# Patient Record
Sex: FEMALE | Race: WHITE | NOT HISPANIC OR LATINO | Employment: UNEMPLOYED | URBAN - METROPOLITAN AREA
[De-identification: names, ages, dates, MRNs, and addresses within clinical notes are randomized per-mention and may not be internally consistent; named-entity substitution may affect disease eponyms.]

---

## 2017-03-10 ENCOUNTER — HOSPITAL ENCOUNTER (OUTPATIENT)
Dept: RADIOLOGY | Facility: HOSPITAL | Age: 82
Discharge: HOME | End: 2017-03-10
Attending: PODIATRIST
Payer: MEDICARE

## 2017-03-10 ENCOUNTER — OFFICE VISIT (OUTPATIENT)
Dept: PODIATRY | Facility: CLINIC | Age: 82
End: 2017-03-10
Payer: MEDICARE

## 2017-03-10 VITALS
SYSTOLIC BLOOD PRESSURE: 138 MMHG | WEIGHT: 105.19 LBS | HEART RATE: 76 BPM | RESPIRATION RATE: 18 BRPM | DIASTOLIC BLOOD PRESSURE: 70 MMHG | BODY MASS INDEX: 20.65 KG/M2 | HEIGHT: 60 IN

## 2017-03-10 DIAGNOSIS — M89.8X9 BONY EXOSTOSIS: ICD-10-CM

## 2017-03-10 DIAGNOSIS — M20.5X2 ADDUCTOVARUS ROTATION OF TOE, ACQUIRED, LEFT: Primary | ICD-10-CM

## 2017-03-10 DIAGNOSIS — L84 CORN OR CALLUS: ICD-10-CM

## 2017-03-10 DIAGNOSIS — M20.5X2 ADDUCTOVARUS ROTATION OF TOE, ACQUIRED, LEFT: ICD-10-CM

## 2017-03-10 PROCEDURE — 73630 X-RAY EXAM OF FOOT: CPT | Mod: 26,LT,, | Performed by: RADIOLOGY

## 2017-03-10 PROCEDURE — 99213 OFFICE O/P EST LOW 20 MIN: CPT | Mod: PBBFAC,PO | Performed by: PODIATRIST

## 2017-03-10 PROCEDURE — 99999 PR PBB SHADOW E&M-EST. PATIENT-LVL III: CPT | Mod: PBBFAC,,, | Performed by: PODIATRIST

## 2017-03-10 PROCEDURE — 99214 OFFICE O/P EST MOD 30 MIN: CPT | Mod: S$PBB,,, | Performed by: PODIATRIST

## 2017-03-10 PROCEDURE — 73630 X-RAY EXAM OF FOOT: CPT | Mod: TC,PO,LT

## 2017-03-10 NOTE — PATIENT INSTRUCTIONS
"Use pads to off load pressure to toes and wider extra depth shoes.      How to Order Small Donut Pads from [employer]:   (Pedi-Pad #101A 1/8" Adhesive Foam)     Call [phone] and present them with reorder number [id number]. [facility] will ask for your insurance information and you may possibly be able to bill your insurance for the price of the pads depending on what insurance you have. [employer] will then mail the bag of pads to your house.     Additional Information:     UPN Number: [id number]  UOM PK/100     You may also use their web site, [email or web address], for ordering.          How to Order Medium Roll Foam Pads from [employer] [facility]:   (Pedi-Foam Sleeves MD 7/8"x12" Single Med)     Call [phone] and present them with reorder number [id number]. [facility] will ask for your insurance information and you may possibly be able to bill your insurance for the price of the pads depending on what insurance you have. [employer] [facility] will then mail the bag of pads to your house.     Additional Information:     UPN Number: [id number]   UOM PK/10    You may also use their web site, [email or web address], for ordering.       How to Order Large Interdigital Pads from [employer]:   (Toe Separators, Large #8130)     Call [phone] and present them with item number [id number]. [facility] will ask for your insurance information and you may possibly be able to bill your insurance for the price of the pads depending on what insurance you have. [employer] [facility] will then mail the bag of pads to your house.     Additional Information:     UOM PK/12    You may also use their web site, [email or web address], for ordering.     **Note: This information is for the LARGE interdigital pads. There is no item number for the SMALL. [employer] has asked that when patients call to place the order, they advise the  that they are ordering the SMALL size, but do not have the item " number. Also, please feel free to call the clinic at [phone] with any questions or concerns.**

## 2017-03-10 NOTE — MR AVS SNAPSHOT
[facility]  [address] [place] [address]  Phone: [phone]                  Kimmy Hawk   3/10/2017 1:00 PM   Office Visit    Description:  Female : 1934   Provider:  Renee Torres DPM   Department:  [facility]           Reason for Visit     Plantar Warts           Diagnoses this Visit        Comments    Adductovarus rotation of toe, acquired, left    -  Primary     Bony exostosis         Corn or callus                To Do List           Future Appointments        Provider Department Dept Phone    3/10/2017 2:00 PM [facility] XR1 [facility] [phone]    2017 2:20 PM Renee Torres DPM [facility] [phone]      Goals (5 Years of Data)     None      Follow-Up and Disposition     Return in about 4 weeks (around 2017) for review x-rays.      [facility]     [facility] Nurse Care Line -  Assistance  Registered nurses in the [facility] provide clinical advisement, health education, appointment booking, and other advisory services.  Call for this free service at [phone].             Medications           Message regarding Medications     Verify the changes and/or additions to your medication regime listed below are the same as discussed with your clinician today.  If any of these changes or additions are incorrect, please notify your healthcare provider.             Verify that the below list of medications is an accurate representation of the medications you are currently taking.  If none reported, the list may be blank. If incorrect, please contact your healthcare provider. Carry this list with you in case of emergency.           Current Medications     amlodipine (NORVASC) 5 MG tablet Take 5 mg by mouth.    cholecalciferol, vitamin D3, (VITAMIN D3) 2,000 unit Cap Take by mouth.    gabapentin (NEURONTIN) 100 MG capsule Take 100 mg by mouth.    irbesartan (AVAPRO) 300 MG tablet Take 300 mg  "by mouth.    magnesium 250 mg Tab Take by mouth.           Clinical Reference Information           Your Vitals Were     BP Pulse Resp Height Weight BMI    138/70 76 18 5' (1.524 m) 47.7 kg (105 lb 2.6 oz) 20.54 kg/m2      Blood Pressure          Most Recent Value    BP  138/70      Allergies as of 3/10/2017     Amoxicillin    Levofloxacin      Immunizations Administered on Date of Encounter - 3/10/2017     None      Orders Placed During Today's Visit     Future Labs/Procedures Expected by Expires    X-Ray Foot Complete Left  3/10/2017 3/10/2018      [facility] Sign-Up     Activating your [facility] account is as easy as 1-2-3!     1) Visit [email or web address], select Sign Up Now, enter this activation code and your date of birth, then select Next.  [email or web address]  Expires: 4/24/2017  1:32 PM      2) Create a username and password to use when you visit [facility] in the future and select a security question in case you lose your password and select Next.    3) Enter your e-mail address and click Sign Up!    Additional Information  If you have questions, please e-mail [email or web address] or call [phone] to talk to our [facility] staff. Remember, [facility] is NOT to be used for urgent needs. For medical emergencies, dial 911.         Instructions    Use pads to off load pressure to toes and wider extra depth shoes.      How to Order Small Donut Pads from [employer]:   (Pedi-Pad #101A 1/8" Adhesive Foam)     Call [phone] and present them with reorder number [id number]. [employer] will ask for your insurance information and you may possibly be able to bill your insurance for the price of the pads depending on what insurance you have. [employer] will then mail the bag of pads to your house.     Additional Information:     UPN Number: [id number]  U PK/100     You may also use their web site, [email or web address], for ordering.          How to Order Medium Roll Foam Pads from [employer]: " "  (Pedi-Foam Sleeves MD 7/8"x12" Single Med)     Call [phone] and present them with reorder number [id number]. [facility] will ask for your insurance information and you may possibly be able to bill your insurance for the price of the pads depending on what insurance you have. [facility] will then mail the bag of pads to your house.     Additional Information:     UPN Number: [id number]   U PK/10    You may also use their web site, [email or web address], for ordering.       How to Order Large Interdigital Pads from [facility]:   (Toe Separators, Large #8130)     Call [phone] and present them with item number [id number]. [facility] will ask for your insurance information and you may possibly be able to bill your insurance for the price of the pads depending on what insurance you have. [facility] [employer] will then mail the bag of pads to your house.     Additional Information:     U PK/12    You may also use their web site, [email or web address], for ordering.     **Note: This information is for the LARGE interdigital pads. There is no item number for the SMALL. [facility] has asked that when patients call to place the order, they advise the  that they are ordering the SMALL size, but do not have the item number. Also, please feel free to call the clinic at [phone] with any questions or concerns.**               Language Assistance Services     ATTENTION: Language assistance services are available, free of charge. Please call [phone].      ATENCIÓN: Si habla radha, tiene a berumen disposición servicios gratuitos de asistencia lingüística. Llame al [phone].     CHÚ Ý: N?u b?n nói Ti?ng Vi?t, có các d?ch v? h? tr? ngôn ng? mi?n phí dành cho b?n. G?i s? [phone].         [facility] complies with applicable Federal civil rights laws and does not discriminate on the basis of race, color, national origin, age, disability, or sex.      "

## 2017-03-14 RX ORDER — UREA 40 %
CREAM (GRAM) TOPICAL 3 TIMES DAILY
Qty: 198.5 G | Refills: 4 | Status: SHIPPED | OUTPATIENT
Start: 2017-03-14 | End: 2017-04-13

## 2017-03-14 NOTE — PROGRESS NOTES
Subjective:      Patient ID: Kimmy Hawk is a 82 y.o. female.    Chief Complaint: Plantar Warts (left foot 5th digit// Dr. Rodriguez 4 months ago)    Kimmy Hawk is a  82 y.o. year old female presenting to podiatry clinic for complaint of kissing corns between 4th toe and 5th toe on the left foot by referral from [facility]. Patient relates noticing pain in between the toes for about 4 months On closer inspection, the patient noticed some buildup and thickening of the skin between the toes. The patient rates the pain as bad as 5/10 aching pain. The patient has noticed the pain worse with weightbearing activities and certain types of tighter fitting shoes. The patient had been seen by Dr. Topete in the was who recommended filing and pads with no relief of pain.      Review of Systems   Constitution: Negative for chills and fever.   Cardiovascular: Negative for chest pain.   Respiratory: Negative for shortness of breath.    Gastrointestinal: Negative for nausea and vomiting.           Objective:      Physical Exam   Constitutional: She is oriented to person, place, and time. She appears well-developed and well-nourished. No distress.   Cardiovascular:   Pulses:       Dorsalis pedis pulses are 2+ on the right side, and 2+ on the left side.        Posterior tibial pulses are 2+ on the right side, and 2+ on the left side.   Capillary fill time 3 seconds to digits bilateral feet.   Musculoskeletal:   Lower extremities:    Deformities: adductovarus hammertoe 5th toe with hypertrophic condyle left.     Normal arch height and rectus feet bilaterally.     5/5 muscle strength and tone in all four quadrants bilaterally.     Pain-free range of motion in all four quadrants with stiffness and limitation bilaterally.     Pain on palpation: sub keratotic lesion left 5th toe.   Neurological: She is alert and oriented to person, place, and time. She displays no Babinski's sign on the right side. She displays no Babinski's sign on  the left side.   Plantar protective threshold sensation by touch via 5.07 SWMF present bilaterally.    Skin:   Lower extremities:    Normal turgor, texture, temperature bilaterally. Digital hair present bilaterally. Warm equally bilaterally.     No edema, varicosities, pigmentary changes.    No wounds, drainage, malodor, erythema, interdigital maceration were noted.     Hyperkeratotic lesion: medial 5th digit dipj left.     Nails are clear bilaterally.       Psychiatric: She has a normal mood and affect.   Nursing note and vitals reviewed.    X-rays ordered.        Assessment:       Encounter Diagnoses   Name Primary?    Adductovarus rotation of toe, acquired, left Yes    Bony exostosis     Corn or callus          Plan:       Kimmy was seen today for plantar warts.    Diagnoses and all orders for this visit:    Adductovarus rotation of toe, acquired, left  -     X-Ray Foot Complete Left; Future    Bony exostosis  -     X-Ray Foot Complete Left; Future    Corn or callus  -     X-Ray Foot Complete Left; Future    Other orders  -     urea (CARMOL) 40 % Crea; Apply topically 3 (three) times daily. Apply to rough skin on feet      I counseled the patient on her conditions, their implications and medical management.    The interdigital corn was sharply trimmed of hyperkeratotic tissue with number 15 blade with good relief of some of her pain. We also discussed the underlying cause of the lesion as a combination of the proximity of the toes rubbing on one another along with the prominent bony condyles or spurs. Various types of pads were dispensed to the patient to relieve pressure between the toes. The patient was also given guidance on shoe modifications along with prescription for urea lotion to prevent rapid build up of the keratotic tissue. We also briefly discussed the possibility of surgical resection of the prominent bone with possible soft tissue release and arthroplasty if conservative measures fail to  resolve the patients problem. X-rays were ordered to review on follow up.      .

## 2017-03-23 ENCOUNTER — TELEPHONE (OUTPATIENT)
Dept: PODIATRY | Facility: CLINIC | Age: 82
End: 2017-03-23

## 2017-03-23 NOTE — TELEPHONE ENCOUNTER
----- Message from Liudmila Lopez sent at 3/23/2017  9:17 AM CDT -----  Pt is requesting a call from nurse in regards to x ray results.          Please call pt back at [phone]

## 2017-03-23 NOTE — TELEPHONE ENCOUNTER
----- Message from Liudmila Lopez sent at 3/23/2017  9:17 AM CDT -----  Pt is requesting a call from nurse in regards to x ray results.          Please call pt back at [phone]

## 2017-03-23 NOTE — TELEPHONE ENCOUNTER
Phoned patient back and gave her her results of her foot Xray. Patient also made aware that provider will go over her results at her next visit as well on April 7th. The call ended well.

## 2017-04-07 ENCOUNTER — OFFICE VISIT (OUTPATIENT)
Dept: PODIATRY | Facility: CLINIC | Age: 82
End: 2017-04-07
Payer: MEDICARE

## 2017-04-07 VITALS
BODY MASS INDEX: 20.6 KG/M2 | HEART RATE: 66 BPM | HEIGHT: 60 IN | SYSTOLIC BLOOD PRESSURE: 118 MMHG | RESPIRATION RATE: 18 BRPM | WEIGHT: 104.94 LBS | DIASTOLIC BLOOD PRESSURE: 60 MMHG

## 2017-04-07 DIAGNOSIS — M20.5X2 ADDUCTOVARUS ROTATION OF TOE, ACQUIRED, LEFT: Primary | ICD-10-CM

## 2017-04-07 DIAGNOSIS — L84 CORN OR CALLUS: ICD-10-CM

## 2017-04-07 DIAGNOSIS — M89.8X9 BONY EXOSTOSIS: ICD-10-CM

## 2017-04-07 PROCEDURE — 99213 OFFICE O/P EST LOW 20 MIN: CPT | Mod: PBBFAC,PO | Performed by: PODIATRIST

## 2017-04-07 PROCEDURE — 99213 OFFICE O/P EST LOW 20 MIN: CPT | Mod: S$PBB,,, | Performed by: PODIATRIST

## 2017-04-07 PROCEDURE — 99999 PR PBB SHADOW E&M-EST. PATIENT-LVL III: CPT | Mod: PBBFAC,,, | Performed by: PODIATRIST

## 2017-04-07 NOTE — MR AVS SNAPSHOT
[facility]  [address] [place] [address]  Phone: [phone]                  Kimmy Hawk   2017 2:20 PM   Office Visit    Description:  Female : 1934   Provider:  Renee Torres DPM   Department:  [facility]           Reason for Visit     Follow-up                To Do List           Future Appointments        Provider Department Dept Phone    2017 8:20 AM Renee Torres DPM [facility] [phone]      Goals (5 Years of Data)     None      [facility]     [facility] Nurse Care Line -  Assistance  Unless otherwise directed by your provider, please contact [facility], our nurse care line that is available for  assistance.     Registered nurses in the [facility] Center provide: appointment scheduling, clinical advisement, health education, and other advisory services.  Call: [phone] (toll free)               Medications           Message regarding Medications     Verify the changes and/or additions to your medication regime listed below are the same as discussed with your clinician today.  If any of these changes or additions are incorrect, please notify your healthcare provider.        STOP taking these medications     irbesartan (AVAPRO) 300 MG tablet Take 300 mg by mouth.           Verify that the below list of medications is an accurate representation of the medications you are currently taking.  If none reported, the list may be blank. If incorrect, please contact your healthcare provider. Carry this list with you in case of emergency.           Current Medications     amlodipine (NORVASC) 5 MG tablet Take 5 mg by mouth.    cholecalciferol, vitamin D3, (VITAMIN D3) 2,000 unit Cap Take by mouth.    gabapentin (NEURONTIN) 100 MG capsule Take 100 mg by mouth.    magnesium 250 mg Tab Take by mouth.    urea (CARMOL) 40 % Crea Apply topically 3 (three) times daily. Apply to rough skin on feet            Clinical Reference Information           Your Vitals Were     BP Pulse Resp Height Weight BMI    118/60 66 18 5' (1.524 m) 47.6 kg (104 lb 15 oz) 20.49 kg/m2      Blood Pressure          Most Recent Value    BP  118/60      Allergies as of 4/7/2017     Amoxicillin    Levofloxacin      Immunizations Administered on Date of Encounter - 4/7/2017     None      [facility] Sign-Up     Activating your [facility] account is as easy as 1-2-3!     1) Visit [email or web address], select Sign Up Now, enter this activation code and your date of birth, then select Next.  [email or web address]  Expires: 4/24/2017  2:32 PM      2) Create a username and password to use when you visit [facility] in the future and select a security question in case you lose your password and select Next.    3) Enter your e-mail address and click Sign Up!    Additional Information  If you have questions, please e-mail [email or web address] or call [phone] to talk to our [facility] staff. Remember, [facility] is NOT to be used for urgent needs. For medical emergencies, dial 911.         Language Assistance Services     ATTENTION: Language assistance services are available, free of charge. Please call [phone].      ATENCIÓN: Si habla español, tiene a berumen disposición servicios gratuitos de asistencia lingüística. Llame al [phone].     CHÚ Ý: N?u b?n nói Ti?ng Vi?t, có các d?ch v? h? tr? ngôn ng? mi?n phí dành cho b?n. G?i s? [phone].         [facility] complies with applicable Federal civil rights laws and does not discriminate on the basis of race, color, national origin, age, disability, or sex.

## 2017-04-08 NOTE — PROGRESS NOTES
Subjective:      Patient ID: Kimmy Hawk is a 82 y.o. female.    Chief Complaint: Follow-up (left foot little digit// Dr. Rodriguez 3-4 months)    Kimmy Hawk is a 82 y.o. year-old female following up for painful left 5th toe interdigital corn. She now rates pain as 5/10 sharp pain that is unchanged. She relates that she did not use recommended prescribed urea lotion because she did not realize that it had been sent to the pharmacy. She has also been using the pads but off and on because they do not stay in place well complains that the corn has returned. She is here to day to review x-rays and inquires if anything more can be done.        Review of Systems   Constitution: Negative for chills and fever.   Cardiovascular: Negative for chest pain.   Respiratory: Negative for shortness of breath.    Gastrointestinal: Negative for nausea and vomiting.           Objective:      Physical Exam   Constitutional: She is oriented to person, place, and time. She appears well-developed and well-nourished. No distress.   Cardiovascular:   Pulses:       Dorsalis pedis pulses are 2+ on the right side, and 2+ on the left side.        Posterior tibial pulses are 2+ on the right side, and 2+ on the left side.   Capillary fill time 3 seconds to digits bilateral feet.   Musculoskeletal:   Lower extremities:    Deformities: adductovarus hammertoe 5th toe with hypertrophic condyle left.     Normal arch height and rectus feet bilaterally.     5/5 muscle strength and tone in all four quadrants bilaterally.     Pain-free range of motion in all four quadrants with stiffness and limitation bilaterally.     Pain on palpation: sub keratotic lesion left 5th toe.   Neurological: She is alert and oriented to person, place, and time. She displays no Babinski's sign on the right side. She displays no Babinski's sign on the left side.   Plantar protective threshold sensation by touch via 5.07 SWMF present bilaterally.    Skin:   Lower  extremities:    Normal turgor, texture, temperature bilaterally. Digital hair present bilaterally. Warm equally bilaterally.     No edema, varicosities, pigmentary changes.    No wounds, drainage, malodor, erythema, interdigital maceration were noted.     Hyperkeratotic lesion: medial 5th digit dipj left.     Nails are clear bilaterally.       Psychiatric: She has a normal mood and affect.   Nursing note and vitals reviewed.    X-rays: Adductovarus under-riding left 5th toe with prominent condyles 4th pipj and 5th dipj.        Assessment:       Encounter Diagnoses   Name Primary?    Adductovarus rotation of toe, acquired, left Yes    Bony exostosis     Corn or callus          Plan:       Kimmy was seen today for follow-up.    Diagnoses and all orders for this visit:    Adductovarus rotation of toe, acquired, left    Bony exostosis    Corn or callus      I counseled the patient on her conditions, their implications and medical management. I independently reviewed the radiographs myself and discussed with patient. No fractures or erosions were noted by me.    Reviewed findings and explained condition to the patient with visual reference to the foot and x-rays reviewed. We discussed the proximity and deformity of the toes as the etiology of recurrent corn formation with pain. We once again discussed use of the urea lotion which she will go ahead and  from the pharmacy. Additional offloading cushion pads were applied but she stated she could not see herself continuing to use the padding. Patient was also guided on shoe gear selection of extra depth or larger toe boxed shoes. We also discussed further about corrective surgery involving combination of arthroplasty and exostectomy with brief review of what to expect preop, intraop and postop. Patient wished to consider surgical option but will go ahead and try conservative recommendations and fill her urea prescription.    .

## 2017-04-13 ENCOUNTER — TELEPHONE (OUTPATIENT)
Dept: PODIATRY | Facility: CLINIC | Age: 82
End: 2017-04-13

## 2017-04-13 NOTE — TELEPHONE ENCOUNTER
----- Message from Cathy Cruz sent at 4/12/2017 12:30 PM CDT -----  Contact: Patient  Patient called to speak with the nurse regarding the cream that was supposed to be called in for her foot. She can be contacted at [phone].    Thanks,  Cathy

## 2017-04-13 NOTE — TELEPHONE ENCOUNTER
Phoned patient back, patient wanted to verify the correct cream for her feet. I explained to patient she has the correct cream and she has refills at her local pharmacy. Patient was happy with the call and the call ended well.

## 2017-05-01 ENCOUNTER — OFFICE VISIT (OUTPATIENT)
Dept: PODIATRY | Facility: CLINIC | Age: 82
End: 2017-05-01
Payer: MEDICARE

## 2017-05-01 VITALS — BODY MASS INDEX: 20.38 KG/M2 | HEIGHT: 60 IN | WEIGHT: 103.81 LBS

## 2017-05-01 DIAGNOSIS — M89.8X9 BONY EXOSTOSIS: ICD-10-CM

## 2017-05-01 DIAGNOSIS — L84 CORN OR CALLUS: ICD-10-CM

## 2017-05-01 DIAGNOSIS — M20.42 HAMMER TOE OF LEFT FOOT: Primary | ICD-10-CM

## 2017-05-01 PROCEDURE — 99999 PR PBB SHADOW E&M-EST. PATIENT-LVL IV: CPT | Mod: PBBFAC,,, | Performed by: PODIATRIST

## 2017-05-01 PROCEDURE — 99214 OFFICE O/P EST MOD 30 MIN: CPT | Mod: 57,S$PBB,, | Performed by: PODIATRIST

## 2017-05-01 PROCEDURE — 99214 OFFICE O/P EST MOD 30 MIN: CPT | Mod: PBBFAC,PO | Performed by: PODIATRIST

## 2017-05-01 RX ORDER — IRBESARTAN 300 MG/1
TABLET ORAL
COMMUNITY
Start: 2017-03-17 | End: 2017-05-01

## 2017-05-01 NOTE — MR AVS SNAPSHOT
[facility]  [address] [facility] [address] [place] [address]  Phone: [phone]  Fax: [phone]                  Kimmy Hawk   2017 1:00 PM   Office Visit    Description:  Female : 1934   Provider:  Renee Torres DPM   Department:  [facility] - Podiatry           Reason for Visit     Follow-up           Diagnoses this Visit        Comments    Hammer toe of left foot    -  Primary     Corn or callus         Bony exostosis                To Do List           Future Appointments        Provider Department Dept Phone    2017 8:20 AM Renee Torres DPM [facility] Pod[facility] [phone]      Goals (5 Years of Data)     None      [facility]     [facility] Care [facility] -  Assistance  Unless otherwise directed by your provider, please contact [facility], our nurse care line that is available for  assistance.     Registered nurses in the [facility] provide: appointment scheduling, clinical advisement, health education, and other advisory services.  Call: [phone] (toll free)               Medications           Message regarding Medications     Verify the changes and/or additions to your medication regime listed below are the same as discussed with your clinician today.  If any of these changes or additions are incorrect, please notify your healthcare provider.        STOP taking these medications     irbesartan (AVAPRO) 300 MG tablet TAKE 1 TABLET DAILY           Verify that the below list of medications is an accurate representation of the medications you are currently taking.  If none reported, the list may be blank. If incorrect, please contact your healthcare provider. Carry this list with you in case of emergency.           Current Medications     amlodipine (NORVASC) 5 MG tablet Take 5 mg by mouth.    cholecalciferol, vitamin D3, (VITAMIN D3) 2,000 unit Cap Take by mouth.    gabapentin (NEURONTIN) 100 MG capsule Take 100 mg by mouth.    magnesium 250  mg Tab Take by mouth.           Clinical Reference Information           Your Vitals Were     Height Weight BMI          5' (1.524 m) 47.1 kg (103 lb 13.4 oz) 20.28 kg/m2        Allergies as of 5/1/2017     Amoxicillin    Levofloxacin      Immunizations Administered on Date of Encounter - 5/1/2017     None      [facility] Sign-Up     Activating your [facility] account is as easy as 1-2-3!     1) Visit [email or web address], select Sign Up Now, enter this activation code and your date of birth, then select Next.  [email or web address]  Expires: 6/15/2017  1:56 PM      2) Create a username and password to use when you visit [facility] in the future and select a security question in case you lose your password and select Next.    3) Enter your e-mail address and click Sign Up!    Additional Information  If you have questions, please e-mail [email or web address] or call [phone] to talk to our [facility] staff. Remember, [facility] is NOT to be used for urgent needs. For medical emergencies, dial 911.         Language Assistance Services     ATTENTION: Language assistance services are available, free of charge. Please call [phone].      ATENCIÓN: Si habla radha, tiene a berumen disposición servicios gratuitos de asistencia lingüística. Llame al [phone].     CHÚ Ý: N?u b?n nói Ti?ng Vi?t, có các d?ch v? h? tr? ngôn ng? mi?n phí dành cho b?n. G?i s? [phone].         [facility] complies with applicable Federal civil rights laws and does not discriminate on the basis of race, color, national origin, age, disability, or sex.

## 2017-05-01 NOTE — PATIENT INSTRUCTIONS
Please review copies of your surgical consent and postop instruction sheets and call if you have any questions or concerns.

## 2017-05-01 NOTE — PROGRESS NOTES
Subjective:      Patient ID: Kimmy Hawk is a 82 y.o. female.    Chief Complaint: Follow-up (Left fifth digit adductovarus. Discuss possible surgery)    Kimmy Hawk is a 82 y.o. 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 The patient has tried conservative therapy alternatives including different shoes, insoles, pads, and shaving of the lesion with temporaty relief to treat recurrent corn and bone spur with toe deformity.  She continues to complain of 5 out of 10 shooting pains with certain types of shoes and weightbearing activities.  The only thing that gives true relief is staying non-weightbearing with no pressure. The pain is now interfering with activities of daily living. The patient now seeks a more permanent solution to this problem and would like to proceed with surgical intervention.          Review of Systems   Constitution: Negative for chills and fever.   Cardiovascular: Negative for chest pain.   Respiratory: Negative for shortness of breath.    Gastrointestinal: Negative for nausea and vomiting.           Objective:      Physical Exam   Constitutional: She is oriented to person, place, and time. She appears well-developed and well-nourished. No distress.   Cardiovascular:   Pulses:       Dorsalis pedis pulses are 2+ on the right side, and 2+ on the left side.        Posterior tibial pulses are 2+ on the right side, and 2+ on the left side.   Capillary fill time 3 seconds to digits bilateral feet.   Musculoskeletal:   Lower extremities:    Deformities: adductovarus hammertoe 5th toe with hypertrophic condyle left.     Normal arch height and rectus feet bilaterally.     5/5 muscle strength and tone in all four quadrants bilaterally.     Pain-free range of motion in all four quadrants with stiffness and limitation bilaterally.     Pain on palpation: sub keratotic lesion left 5th toe.   Neurological: She is alert and oriented to person,  place, and time. She displays no Babinski's sign on the right side. She displays no Babinski's sign on the left side.   Plantar protective threshold sensation by touch via 5.07 SWMF present bilaterally.    Skin:   Lower extremities:    Normal turgor, texture, temperature bilaterally. Digital hair present bilaterally. Warm equally bilaterally.     No edema, varicosities, pigmentary changes.    No wounds, drainage, malodor, erythema, interdigital maceration were noted.     Hyperkeratotic lesion: medial 5th digit dipj left.     Nails are clear bilaterally.       Psychiatric: She has a normal mood and affect.   Nursing note and vitals reviewed.            Assessment:       Encounter Diagnoses   Name Primary?    Hammer toe of left foot Yes    Corn or callus     Bony exostosis          Plan:       Kimmy was seen today for follow-up.    Diagnoses and all orders for this visit:    Hammer toe of left foot  -     Case Request Operating Room: REPAIR-HAMMER TOE; Standing  -     Case Request Operating Room: REPAIR-HAMMER TOE    Corn or callus  -     Case Request Operating Room: REPAIR-HAMMER TOE; Standing  -     Case Request Operating Room: REPAIR-HAMMER TOE    Bony exostosis  -     Case Request Operating Room: REPAIR-HAMMER TOE; Standing  -     Case Request Operating Room: REPAIR-HAMMER TOE      I counseled the patient on her conditions, their implications and medical management.    At this time recommendation was made for medial fifth digit condylectomy with derotational fifth digit arthroplasty left foot. Preop Intra-Op and postoperative course was reviewed with the patient and patient appeared to be in good understanding. Risk benefits and alternatives were reviewed with the patient and consent was signed making no guarantees regarding the outcome of the surgery. All of the patient's questions were answered and concerns were addressed. Copies were made of the consent and given to the patient and patient was instructed  to call for any additional questions and if any problems arise. The patient is scheduled for surgery at [facility].    .

## 2017-05-30 ENCOUNTER — TELEPHONE (OUTPATIENT)
Dept: PODIATRY | Facility: CLINIC | Age: 82
End: 2017-05-30

## 2017-05-30 NOTE — TELEPHONE ENCOUNTER
----- Message from Gayle Dunbar sent at 5/30/2017  9:40 AM CDT -----  Contact: pt  States she has an appt on 6/22 and she would like a later time that day. Nothing available. Please call pt at [phone]. Thank you

## 2017-05-30 NOTE — TELEPHONE ENCOUNTER
Ms. Hawk was given an return call, and she was rescheduled with Dr. Torres for 11am at the [facility] on 6/22/2017. Patient verbalized understanding and the call ended well.

## 2017-06-05 NOTE — PRE ADMISSION SCREENING
Pre op instructions reviewed with patient per phone:    To confirm, Your surgeon has instructed you:  Surgery is scheduled 06/08/17 at 0700.      Please report to [facility] 1st floor main lobby by 0530  Pre admit office to call afternoon prior to surgery with final arrival time.      INSTRUCTIONS IMPORTANT!!!  ¨ Do not eat, drink, or smoke after 12 midnight-including water. OK to brush teeth, no gum, candy or mints!    ¨ Take only these medicines with a small swallow of water-morning of surgery.  Amlodipine        ____  Do not wear makeup, including mascara.  ____  No powder, lotions or creams to surgical area.  ____  Please remove all jewelry, including piercings and leave at home.  ____  No money or valuables needed. Please leave at home.  ____  Please bring identification and insurance information to hospital.  ____  If going home the same day, arrange for a ride home. You will not be able to   drive if Anesthesia was used.  ____  Children, under 12 years old, must remain in the waiting room with an adult.  They are not allowed in patient areas.  ____  Wear loose fitting clothing. Allow for dressings, bandages.  ____  Stop Aspirin, Ibuprofen, Motrin and Aleve at least 5-7 days before surgery, unless otherwise instructed by your doctor, or the nurse.   You MAY use Tylenol/acetaminophen until day of surgery.  ____  If you take diabetic medication, do not take am of surgery unless instructed by   Doctor.  ____ Stop taking any Fish Oil supplement or any Vitamins that contain Vitamin E at least 5 days prior to surgery.          Bathing Instructions-- The night before surgery and the morning prior to coming to the hospital:   -Do not shave the surgical area.   -Shower and wash your hair and body as usual with anti-bacterial  soap and shampoo.   -Rinse your hair and body completely.   -Use one packet of hibiclens to wash the surgical site (using your hand) gently for 5 minutes.  Do not scrub  you skin too hard.   -Do not use hibiclens on your head, face, or genitals.   -Do not wash with anti-bacterial soap after you use the hibiclens.   -Rinse your body thoroughly.   -Dry with clean, soft towel.  Do not use lotion, cream, deodorant, or powders on   the surgical site.    Use antibacterial soap in place of hibiclens if your surgery is on the head, face or genitals.         Surgical Site Infection    Prevention of surgical site infections:     -Keep incisions clean and dry.   -Do not soak/submerge incisions in water until completely healed.   -Do not apply lotions, powders, creams, or deodorants to site.   -Always make sure hands are cleaned with antibacterial soap/ alcohol-based   prior to touching the surgical site.  (This includes doctors, nurses, staff, and yourself.)    Signs and symptoms:   -Redness and pain around the area where you had surgery   -Drainage of cloudy fluid from your surgical wound   -Fever over 100.4  I have read or had read and explained to me, and understand the above information.

## 2017-06-06 ENCOUNTER — TELEPHONE (OUTPATIENT)
Dept: PODIATRY | Facility: CLINIC | Age: 82
End: 2017-06-06

## 2017-06-06 NOTE — TELEPHONE ENCOUNTER
----- Message from Sasha Kc sent at 6/2/2017 10:52 AM CDT -----  Contact: pt  Pt request call concerning her appt on 6/8, pt can be reached at [phone]///thxMW

## 2017-06-06 NOTE — TELEPHONE ENCOUNTER
----- Message from Belkis Pabon sent at 6/5/2017  9:32 AM CDT -----  Patient needs to know what time she needs to be at the hospital for her surgery and what other specifics that she needs to know?   Call her at [phone].                                             [email or web address]

## 2017-06-06 NOTE — TELEPHONE ENCOUNTER
Returned 's call regarding her wanting to know what time she should arrive for her surgery that is scheduled for 6/8/17 with .  stated the surgery department contacted her on yesterday (6/5/17) and informed her of her arrival time for surgery. There was a mutual understanding stated and the call ended well.

## 2017-06-08 ENCOUNTER — ANESTHESIA (OUTPATIENT)
Dept: SURGERY | Facility: HOSPITAL | Age: 82
End: 2017-06-08
Payer: MEDICARE

## 2017-06-08 ENCOUNTER — HOSPITAL ENCOUNTER (OUTPATIENT)
Facility: HOSPITAL | Age: 82
Discharge: HOME | End: 2017-06-08
Attending: PODIATRIST | Admitting: PODIATRIST
Payer: MEDICARE

## 2017-06-08 ENCOUNTER — TELEPHONE (OUTPATIENT)
Dept: PODIATRY | Facility: CLINIC | Age: 82
End: 2017-06-08

## 2017-06-08 ENCOUNTER — ANESTHESIA EVENT (OUTPATIENT)
Dept: SURGERY | Facility: HOSPITAL | Age: 82
End: 2017-06-08
Payer: MEDICARE

## 2017-06-08 VITALS
TEMPERATURE: 98 F | OXYGEN SATURATION: 100 % | DIASTOLIC BLOOD PRESSURE: 75 MMHG | RESPIRATION RATE: 22 BRPM | WEIGHT: 103.38 LBS | SYSTOLIC BLOOD PRESSURE: 134 MMHG | HEART RATE: 63 BPM | BODY MASS INDEX: 20.3 KG/M2 | HEIGHT: 60 IN

## 2017-06-08 DIAGNOSIS — M20.42 HAMMER TOE OF LEFT FOOT: Primary | ICD-10-CM

## 2017-06-08 DIAGNOSIS — M89.8X9 BONY EXOSTOSIS: ICD-10-CM

## 2017-06-08 DIAGNOSIS — M20.40: ICD-10-CM

## 2017-06-08 PROCEDURE — 63600175 PHARM REV CODE 636 W HCPCS: Performed by: NURSE ANESTHETIST, CERTIFIED REGISTERED

## 2017-06-08 PROCEDURE — 71000033 HC RECOVERY, INTIAL HOUR: Performed by: PODIATRIST

## 2017-06-08 PROCEDURE — 37000008 HC ANESTHESIA 1ST 15 MINUTES: Performed by: PODIATRIST

## 2017-06-08 PROCEDURE — 36000707: Performed by: PODIATRIST

## 2017-06-08 PROCEDURE — 71000015 HC POSTOP RECOV 1ST HR: Performed by: PODIATRIST

## 2017-06-08 PROCEDURE — 25000003 PHARM REV CODE 250: Performed by: PODIATRIST

## 2017-06-08 PROCEDURE — 27201423 OPTIME MED/SURG SUP & DEVICES STERILE SUPPLY: Performed by: PODIATRIST

## 2017-06-08 PROCEDURE — 28285 REPAIR OF HAMMERTOE: CPT | Mod: T4,LT,, | Performed by: PODIATRIST

## 2017-06-08 PROCEDURE — 36000706: Performed by: PODIATRIST

## 2017-06-08 PROCEDURE — 37000009 HC ANESTHESIA EA ADD 15 MINS: Performed by: PODIATRIST

## 2017-06-08 PROCEDURE — 25000003 PHARM REV CODE 250: Performed by: NURSE ANESTHETIST, CERTIFIED REGISTERED

## 2017-06-08 RX ORDER — SODIUM CHLORIDE 9 MG/ML
3 INJECTION, SOLUTION INTRAMUSCULAR; INTRAVENOUS; SUBCUTANEOUS
Status: DISCONTINUED | OUTPATIENT
Start: 2017-06-08 | End: 2017-06-08 | Stop reason: HOSPADM

## 2017-06-08 RX ORDER — ACETAMINOPHEN 10 MG/ML
1000 INJECTION, SOLUTION INTRAVENOUS ONCE
Status: DISCONTINUED | OUTPATIENT
Start: 2017-06-08 | End: 2017-06-08 | Stop reason: HOSPADM

## 2017-06-08 RX ORDER — CLINDAMYCIN PHOSPHATE 900 MG/50ML
900 INJECTION, SOLUTION INTRAVENOUS
Status: DISCONTINUED | OUTPATIENT
Start: 2017-06-08 | End: 2017-06-08 | Stop reason: HOSPADM

## 2017-06-08 RX ORDER — LIDOCAINE HYDROCHLORIDE 10 MG/ML
1 INJECTION, SOLUTION EPIDURAL; INFILTRATION; INTRACAUDAL; PERINEURAL ONCE
Status: DISCONTINUED | OUTPATIENT
Start: 2017-06-08 | End: 2017-06-08 | Stop reason: HOSPADM

## 2017-06-08 RX ORDER — PROPOFOL 10 MG/ML
VIAL (ML) INTRAVENOUS CONTINUOUS PRN
Status: DISCONTINUED | OUTPATIENT
Start: 2017-06-08 | End: 2017-06-08

## 2017-06-08 RX ORDER — MEPERIDINE HYDROCHLORIDE 50 MG/ML
12.5 INJECTION INTRAMUSCULAR; INTRAVENOUS; SUBCUTANEOUS ONCE AS NEEDED
Status: DISCONTINUED | OUTPATIENT
Start: 2017-06-08 | End: 2017-06-08 | Stop reason: HOSPADM

## 2017-06-08 RX ORDER — SODIUM CHLORIDE, SODIUM LACTATE, POTASSIUM CHLORIDE, CALCIUM CHLORIDE 600; 310; 30; 20 MG/100ML; MG/100ML; MG/100ML; MG/100ML
INJECTION, SOLUTION INTRAVENOUS CONTINUOUS PRN
Status: DISCONTINUED | OUTPATIENT
Start: 2017-06-08 | End: 2017-06-08

## 2017-06-08 RX ORDER — SODIUM CHLORIDE 9 MG/ML
3 INJECTION, SOLUTION INTRAMUSCULAR; INTRAVENOUS; SUBCUTANEOUS EVERY 8 HOURS
Status: DISCONTINUED | OUTPATIENT
Start: 2017-06-08 | End: 2017-06-08 | Stop reason: HOSPADM

## 2017-06-08 RX ORDER — PROPOFOL 10 MG/ML
VIAL (ML) INTRAVENOUS
Status: DISCONTINUED | OUTPATIENT
Start: 2017-06-08 | End: 2017-06-08

## 2017-06-08 RX ORDER — LIDOCAINE HYDROCHLORIDE 10 MG/ML
INJECTION, SOLUTION EPIDURAL; INFILTRATION; INTRACAUDAL; PERINEURAL
Status: DISCONTINUED | OUTPATIENT
Start: 2017-06-08 | End: 2017-06-08 | Stop reason: HOSPADM

## 2017-06-08 RX ORDER — HYDROCODONE BITARTRATE AND ACETAMINOPHEN 5; 325 MG/1; MG/1
1 TABLET ORAL EVERY 4 HOURS PRN
Status: DISCONTINUED | OUTPATIENT
Start: 2017-06-08 | End: 2017-06-08 | Stop reason: HOSPADM

## 2017-06-08 RX ORDER — CLINDAMYCIN HYDROCHLORIDE 300 MG/1
300 CAPSULE ORAL EVERY 8 HOURS
Qty: 30 CAPSULE | Refills: 0 | Status: SHIPPED | OUTPATIENT
Start: 2017-06-08 | End: 2017-06-18

## 2017-06-08 RX ORDER — HYDROCODONE BITARTRATE AND ACETAMINOPHEN 7.5; 325 MG/1; MG/1
1 TABLET ORAL EVERY 6 HOURS PRN
Qty: 30 TABLET | Refills: 0 | Status: SHIPPED | OUTPATIENT
Start: 2017-06-08 | End: 2017-06-14

## 2017-06-08 RX ORDER — BUPIVACAINE HYDROCHLORIDE 2.5 MG/ML
INJECTION, SOLUTION EPIDURAL; INFILTRATION; INTRACAUDAL
Status: DISCONTINUED | OUTPATIENT
Start: 2017-06-08 | End: 2017-06-08 | Stop reason: HOSPADM

## 2017-06-08 RX ORDER — ONDANSETRON 2 MG/ML
INJECTION INTRAMUSCULAR; INTRAVENOUS
Status: DISCONTINUED | OUTPATIENT
Start: 2017-06-08 | End: 2017-06-08

## 2017-06-08 RX ORDER — HYDROMORPHONE HYDROCHLORIDE 2 MG/ML
0.2 INJECTION, SOLUTION INTRAMUSCULAR; INTRAVENOUS; SUBCUTANEOUS EVERY 5 MIN PRN
Status: DISCONTINUED | OUTPATIENT
Start: 2017-06-08 | End: 2017-06-08 | Stop reason: HOSPADM

## 2017-06-08 RX ORDER — LIDOCAINE HYDROCHLORIDE 10 MG/ML
INJECTION INFILTRATION; PERINEURAL
Status: DISCONTINUED | OUTPATIENT
Start: 2017-06-08 | End: 2017-06-08

## 2017-06-08 RX ADMIN — ONDANSETRON 4 MG: 2 INJECTION, SOLUTION INTRAMUSCULAR; INTRAVENOUS at 07:06

## 2017-06-08 RX ADMIN — PROPOFOL 25 MCG/KG/MIN: 10 INJECTION, EMULSION INTRAVENOUS at 07:06

## 2017-06-08 RX ADMIN — PROPOFOL 50 MG: 10 INJECTION, EMULSION INTRAVENOUS at 07:06

## 2017-06-08 RX ADMIN — CLINDAMYCIN IN 5 PERCENT DEXTROSE 900 MG: 18 INJECTION, SOLUTION INTRAVENOUS at 06:06

## 2017-06-08 RX ADMIN — SODIUM CHLORIDE, SODIUM LACTATE, POTASSIUM CHLORIDE, AND CALCIUM CHLORIDE: 600; 310; 30; 20 INJECTION, SOLUTION INTRAVENOUS at 06:06

## 2017-06-08 RX ADMIN — LIDOCAINE HYDROCHLORIDE 60 MG: 10 INJECTION, SOLUTION INFILTRATION; PERINEURAL at 07:06

## 2017-06-08 NOTE — ANESTHESIA PREPROCEDURE EVALUATION
06/08/2017  Kimmy Hawk is a 82 y.o., female.    Pre-op Assessment    I have reviewed the Patient Summary Reports.      I have reviewed the Medications.     Review of Systems  Social:  Non-Smoker    Hematology/Oncology:  Hematology Normal   Oncology Normal     EENT/Dental:EENT/Dental Normal   Cardiovascular:   Hypertension    Pulmonary:  Pulmonary Normal    Renal/:  Renal/ Normal     Hepatic/GI:  Hepatic/GI Normal    Musculoskeletal:   Arthritis  History of back surgery, rotator cuff repair and foot surgery.   Endocrine:  Endocrine Normal    Dermatological:  Skin Normal    Psych:  Psychiatric Normal           Physical Exam  General:  Well nourished            Mental Status:  Mental Status Findings:  Cooperative, Alert and Oriented         Anesthesia Plan  Type of Anesthesia, risks & benefits discussed:  Anesthesia Type:  MAC  Patient's Preference:   Intra-op Monitoring Plan: standard ASA monitors  Intra-op Monitoring Plan Comments:   Post Op Pain Control Plan: multimodal analgesia  Post Op Pain Control Plan Comments:   Induction:   IV  Beta Blocker:  Patient is not currently on a Beta-Blocker (No further documentation required).       Informed Consent:    ASA Score: 2     Day of Surgery Review of History & Physical:

## 2017-06-08 NOTE — ANESTHESIA POSTPROCEDURE EVALUATION
Anesthesia Post Evaluation    Patient: Kimmy Hawk    Procedure(s) Performed: Procedure(s) (LRB):  REPAIR-HAMMER TOE (Left)  EXOSTECTOMY (Left)    Final Anesthesia Type: MAC  Patient location during evaluation: PACU  Patient participation: Yes- Able to Participate  Level of consciousness: awake  Post-procedure vital signs: reviewed and stable  Pain management: adequate  Airway patency: patent  PONV status at discharge: No PONV  Anesthetic complications: no      Cardiovascular status: blood pressure returned to baseline  Respiratory status: unassisted, room air and spontaneous ventilation  Hydration status: euvolemic  Follow-up not needed.        Visit Vitals  /75   Pulse 63   Temp 36.8 °C (98.2 °F) (Temporal)   Resp (!) 22   Ht 5' (1.524 m)   Wt 46.9 kg (103 lb 6.3 oz)   SpO2 100%   Breastfeeding? No   BMI 20.19 kg/m²       Pain/Helen Score: Pain Assessment Performed: Yes (6/8/2017  8:44 AM)  Presence of Pain: denies (6/8/2017  8:44 AM)  Helen Score: 9 (6/8/2017  8:44 AM)

## 2017-06-08 NOTE — BRIEF OP NOTE
[facility]  Brief Operative Note     SUMMARY     Surgery Date: 6/8/2017     Surgeon(s) and Role:     * Renee Torres DPM - Primary    Assisting Surgeon: None    Pre-op Diagnosis:  Bony exostosis [M89.8X9]  Corn or callus [L84]  Hammer toe of left foot [M20.42]    Post-op Diagnosis:  Post-Op Diagnosis Codes:     * Bony exostosis [M89.8X9]     * Corn or callus [L84]     * Hammer toe of left foot [M20.42]    Procedure(s) (LRB):  REPAIR-HAMMER TOE (Left)  EXOSTECTOMY (Left)    Anesthesia: Local MAC    Description of the findings of the procedure: adductovarus left 5th toe with medial distal phalanx base condyle exostosis    Findings/Key Components: dictated    Estimated Blood Loss: 5 mL         Specimens:   Specimen (12h ago through future)    None          Discharge Note    SUMMARY     Admit Date: 6/8/2017    Discharge Date and Time:  06/08/2017 8:13 AM    Hospital Course (synopsis of major diagnoses, care, treatment, and services provided during the course of the hospital stay): VSS. VSI. Patient tolerated procedure and anesthesia well with no complications.        Final Diagnosis: Post-Op Diagnosis Codes:     * Bony exostosis [M89.8X9]     * Corn or callus [L84]     * Hammer toe of left foot [M20.42]    Disposition: Home or Self Care    Follow Up/Patient Instructions:     Medications:  Reconciled Home Medications:   Current Discharge Medication List      CONTINUE these medications which have NOT CHANGED    Details   ACETAMINOPHEN (TYLENOL ARTHRITIS ORAL) Take by mouth daily as needed.      amlodipine (NORVASC) 5 MG tablet Take 5 mg by mouth once daily.       cholecalciferol, vitamin D3, (VITAMIN D3) 2,000 unit Cap Take by mouth.      gabapentin (NEURONTIN) 100 MG capsule Take 100 mg by mouth every evening.       magnesium 250 mg Tab Take 250 mg by mouth once daily at 6am.            No discharge procedures on file.

## 2017-06-08 NOTE — PLAN OF CARE
Pt resting on stretcher, denies pain at present. Respirations even and unlabored on room air. VSS. Pt meets d/c criteria.

## 2017-06-08 NOTE — DISCHARGE INSTRUCTIONS
Keep post op dressings clean, dry and intact. Keep foot elevated and no weightbearing until instructed.    Step-by-Step  Using a Walker (Non-Weight Bearing)       Date Last Reviewed: 7/9/2015 © 2000-2016 [employer]. [address] [place] [address], [place] [address]. All rights reserved. This information is not intended as a substitute for professional medical care. Always follow your healthcare professional's instructions.      General Information:  1.  Do not drink alcoholic beverages including beer for 24 hours or as long as you are on pain medication..  2.  Do not drive a motor vehicle, operate machinery or power tools, or signs legal papers for 24 hours or as long as you are on pain medication.   3.  You may experience light-headedness, dizziness, and sleepiness following surgery. Please do not stay alone. A responsible adult should be with you for this 24 hour period.  4.  Go home and rest.  5. Progress slowly to a normal diet unless instructed.  Otherwise, begin with liquids such as soft drinks, then soup and crackers working up to solid foods. Drink plenty of nonalcoholic fluids.  6.  Certain anesthetics and pain medications produce nausea and vomiting in certain       individuals. If nausea becomes a problem at home, call you doctor.  7. A nurse will be calling you sometime after surgery. Do not be alarmed. This is our way of finding out how you are doing.  8. Several times every hour while you are awake, take 2-3 deep breaths and cough. If you had stomach surgery hold a pillow or rolled towel firmly against your stomach before you cough. This will help with any pain the cough might cause.  9. Several times every hour while you are awake, pump and flex your feet 5-6 times and do foot circles. This will help prevent blood clots.  10.Call your doctor for severe pain, bleeding, fever, or signs or symptoms of infection (pain, swelling, redness, foul odor, drainage).  11.You can contact your doctor  anytime by [phone] for the [facility] (at [facility]) or [phone] for the [facility] on [facility].   [email or web address] is another way to contact your doctor if you are an active participant online with [facility].    Clindamycin capsules  What is this medicine?  CLINDAMYCIN (KLIN yesenia REID sin) is a lincosamide antibiotic. It is used to treat certain kinds of bacterial infections. It will not work for colds, flu, or other viral infections.  How should I use this medicine?  Take this medicine by mouth with a full glass of water. Follow the directions on the prescription label. You can take this medicine with food or on an empty stomach. If the medicine upsets your stomach, take it with food. Take your medicine at regular intervals. Do not take your medicine more often than directed. Take all of your medicine as directed even if you think your are better. Do not skip doses or stop your medicine early.  Talk to your pediatrician regarding the use of this medicine in children. Special care may be needed.  What side effects may I notice from receiving this medicine?  Side effects that you should report to your doctor or health care professional as soon as possible:  · allergic reactions like skin rash, itching or hives, swelling of the face, lips, or tongue  · dark urine  · pain on swallowing  · redness, blistering, peeling or loosening of the skin, including inside the mouth  · unusual bleeding or bruising  · unusually weak or tired  · yellowing of eyes or skin  Side effects that usually do not require medical attention (report to your doctor or health care professional if they continue or are bothersome):  · diarrhea  · itching in the rectal or genital area  · joint pain  · nausea, vomiting  · stomach pain  What may interact with this medicine?  · birth control pills  · chloramphenicol  · erythromycin  · kaolin products  What if I miss a dose?  If you miss a dose, take it as soon as you can.  If it is almost time for your next dose, take only that dose. Do not take double or extra doses.  Where should I keep my medicine?  Keep out of the reach of children.  Store at room temperature between 20 and 25 degrees C (68 and 77 degrees F). Throw away any unused medicine after the expiration date.  What should I tell my health care provider before I take this medicine?  They need to know if you have any of these conditions:  · kidney disease  · liver disease  · stomach problems like colitis  · an unusual or allergic reaction to clindamycin, lincomycin, or other medicines, foods, dyes like tartrazine or preservatives  · pregnant or trying to get pregnant  · breast-feeding  What should I watch for while using this medicine?  Tell your doctor or healthcare professional if your symptoms do not start to get better or if they get worse.  Do not treat diarrhea with over the counter products. Contact your doctor if you have diarrhea that lasts more than 2 days or if it is severe and watery.  Date Last Reviewed:   NOTE:This sheet is a summary. It may not cover all possible information. If you have questions about this medicine, talk to your doctor, pharmacist, or health care provider. Copyright© 2016 [employer]                Acetaminophen; Hydrocodone tablets or capsules  What is this medicine?  ACETAMINOPHEN; HYDROCODONE (a set a TOY stephanie fen; myron droe KOE done) is a pain reliever. It is used to treat moderate to severe pain.  How should I use this medicine?  Take this medicine by mouth with a glass of water. Follow the directions on the prescription label. You can take it with or without food. If it upsets your stomach, take it with food. Do not take your medicine more often than directed.  A special MedGuide will be given to you by the pharmacist with each prescription and refill. Be sure to read this information carefully each time.  Talk to your pediatrician regarding the use of this medicine in children. Special  care may be needed.  What side effects may I notice from receiving this medicine?  Side effects that you should report to your doctor or health care professional as soon as possible:  · allergic reactions like skin rash, itching or hives, swelling of the face, lips, or tongue  · breathing problems  · confusion  · redness, blistering, peeling or loosening of the skin, including inside the mouth  · signs and symptoms of low blood pressure like dizziness; feeling faint or lightheaded, falls; unusually weak or tired  · trouble passing urine or change in the amount of urine  · yellowing of the eyes or skin  Side effects that usually do not require medical attention (report to your doctor or health care professional if they continue or are bothersome):  · constipation  · dry mouth  · nausea, vomiting  · tiredness  What may interact with this medicine?  This medicine may interact with the following medications:  · alcohol  · antiviral medicines for HIV or AIDS  · atropine  · antihistamines for allergy, cough and cold  · certain antibiotics like erythromycin, clarithromycin  · certain medicines for anxiety or sleep  · certain medicines for bladder problems like oxybutynin, tolterodine  · certain medicines for depression like amitriptyline, fluoxetine, sertraline  · certain medicines for fungal infections like ketoconazole and itraconazole  · certain medicines for Parkinson's disease like benztropine, trihexyphenidyl  · certain medicines for seizures like carbamazepine, phenobarbital, phenytoin, primidone  · certain medicines for stomach problems like dicyclomine, hyoscyamine  · certain medicines for travel sickness like scopolamine  · general anesthetics like halothane, isoflurane, methoxyflurane, propofol  · ipratropium  · local anesthetics like lidocaine, pramoxine, tetracaine  · MAOIs like Carbex, Eldepryl, Marplan, Nardil, and Parnate  · medicines that relax muscles for surgery  · other medicines with  acetaminophen  · other narcotic medicines for pain or cough  · phenothiazines like chlorpromazine, mesoridazine, prochlorperazine, thioridazine  · rifampin  What if I miss a dose?  If you miss a dose, take it as soon as you can. If it is almost time for your next dose, take only that dose. Do not take double or extra doses.  Where should I keep my medicine?  Keep out of the reach of children. This medicine can be abused. Keep your medicine in a safe place to protect it from theft. Do not share this medicine with anyone. Selling or giving away this medicine is dangerous and against the law.  This medicine may cause accidental overdose and death if it taken by other adults, children, or pets. Mix any unused medicine with a substance like cat litter or coffee grounds. Then throw the medicine away in a sealed container like a sealed bag or a coffee can with a lid. Do not use the medicine after the expiration date.  Store at room temperature between 15 and 30 degrees C (59 and 86 degrees F).  What should I tell my health care provider before I take this medicine?  They need to know if you have any of these conditions:  · brain tumor  · Crohn's disease, inflammatory bowel disease, or ulcerative colitis  · drug abuse or addiction  · head injury  · heart or circulation problems  · if you often drink alcohol  · kidney disease or problems going to the bathroom  · liver disease  · lung disease, asthma, or breathing problems  · an unusual or allergic reaction to acetaminophen, hydrocodone, other opioid analgesics, other medicines, foods, dyes, or preservatives  · pregnant or trying to get pregnant  · breast-feeding  What should I watch for while using this medicine?  Tell your doctor or health care professional if your pain does not go away, if it gets worse, or if you have new or a different type of pain. You may develop tolerance to the medicine. Tolerance means that you will need a higher dose of the medicine for pain  relief. Tolerance is normal and is expected if you take the medicine for a long time.  Do not suddenly stop taking your medicine because you may develop a severe reaction. Your body becomes used to the medicine. This does NOT mean you are addicted. Addiction is a behavior related to getting and using a drug for a non-medical reason. If you have pain, you have a medical reason to take pain medicine. Your doctor will tell you how much medicine to take. If your doctor wants you to stop the medicine, the dose will be slowly lowered over time to avoid any side effects.  There are different types of narcotic medicines (opiates). If you take more than one type at the same time or if you are taking another medicine that also causes drowsiness, you may have more side effects. Give your health care provider a list of all medicines you use. Your doctor will tell you how much medicine to take. Do not take more medicine than directed. Call emergency for help if you have problems breathing or unusual sleepiness.  Do not take other medicines that contain acetaminophen with this medicine. Always read labels carefully. If you have questions, ask your doctor or pharmacist.  If you take too much acetaminophen get medical help right away. Too much acetaminophen can be very dangerous and cause liver damage. Even if you do not have symptoms, it is important to get help right away.  You may get drowsy or dizzy. Do not drive, use machinery, or do anything that needs mental alertness until you know how this medicine affects you. Do not stand or sit up quickly, especially if you are an older patient. This reduces the risk of dizzy or fainting spells. Alcohol may interfere with the effect of this medicine. Avoid alcoholic drinks.  The medicine will cause constipation. Try to have a bowel movement at least every 2 to 3 days. If you do not have a bowel movement for 3 days, call your doctor or health care professional.  Your mouth may get dry.  Chewing sugarless gum or sucking hard candy, and drinking plenty of water may help. Contact your doctor if the problem does not go away or is severe.  Date Last Reviewed:   NOTE:This sheet is a summary. It may not cover all possible information. If you have questions about this medicine, talk to your doctor, pharmacist, or health care provider. Copyright© 2016 [employer]

## 2017-06-08 NOTE — TRANSFER OF CARE
Anesthesia Transfer of Care Note    Patient: Kimmy Hawk    Procedure(s) Performed: Procedure(s) (LRB):  REPAIR-HAMMER TOE (Left)  EXOSTECTOMY (Left)    Patient location: PACU    Anesthesia Type: MAC    Transport from OR: Transported from OR on room air with adequate spontaneous ventilation    Post pain: adequate analgesia    Post assessment: no apparent anesthetic complications    Post vital signs: stable    Level of consciousness: awake, alert and oriented    Nausea/Vomiting: no nausea/vomiting    Complications: none    Transfer of care protocol was followed      Last vitals:   Visit Vitals  BP (!) 168/76 (BP Location: Right arm, Patient Position: Sitting, BP Method: Automatic)   Pulse 73   Temp 36.7 °C (98 °F) (Oral)   Resp 18   Ht 5' (1.524 m)   Wt 46.9 kg (103 lb 6.3 oz)   SpO2 97%   Breastfeeding? No   BMI 20.19 kg/m²

## 2017-06-08 NOTE — TELEPHONE ENCOUNTER
Spoke to patient's daughter, Mary, who states that the patient has not received the walker that was supposed to be delivered following the patient's surgery. Mary was informed that the hospital faxed an order for the walker to [employer] this morning and then again, per KAIDEN Youssef, around 3pm this afternoon. Mary was provided the phone number to [employer]: [phone].

## 2017-06-08 NOTE — INTERVAL H&P NOTE
The patient has been examined and the H&P has been reviewed:    I concur with the findings and no changes have occurred since H&P was written. 5/29/2017    Anesthesia/Surgery risks, benefits and alternative options discussed and understood by patient/family.          Active Hospital Problems    Diagnosis  POA    HT (hammer toe) [M20.40]  Yes      Resolved Hospital Problems    Diagnosis Date Resolved POA   No resolved problems to display.

## 2017-06-08 NOTE — PLAN OF CARE
Gave discharge instructions to patient and daughter, verbalized understanding.  All questions answered to the satisfaction of patient and daughter.  To POV via WC, into POV without difficulty, distress or assist.

## 2017-06-10 NOTE — OP NOTE
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    PROCEDURES:  Fifth digit PIPJ arthroplasty derotational left foot and fifth   digit medial DIPJ, condylectomy, exostectomy left foot.    SURGEON:  Renee Torres DPM.    ANESTHESIA:  MAC with local.    HEMOSTASIS:  Pneumatic ankle tourniquet.    ESTIMATED BLOOD LOSS:  Less than 5 mL.    PROCEDURE IN DETAIL:  The patient was brought into the Operating Room and placed   on the operating table in the supine position.  Under mild IV sedation, local   anesthesia 1:1 mixture of 1% lidocaine plain and 0.5% Marcaine plain was   administered to the left foot.  Pneumatic ankle tourniquet was placed around the   left ankle and the foot was then scrubbed, prepped and draped in the usual   aseptic manner.  Following Esmarch exsanguination, tourniquet was inflated to   250 mmHg.  Attention was then directed to the dorsolateral aspect of the fifth   digit PIPJ where 2 semielliptical incisions were placed over the fifth digit   PIPJ directed from distal medial to proximal lateral.  The ellipse of tissue was   passed from the operative field and dissection was continued down to the fifth   digit PIPJ capsule, taking care to identify and retract all neurovascular   elements.  At this time, a transverse tenotomy, capsulotomy was performed into   the fifth digit PIPJ and all soft tissue attachments were freed from the head of   the proximal phalanx.  The long extensor tendon was reflected proximally and   approximately 3 mm of the head of the proximal phalanx was resected and passed   from the operative field in total.  Next, the wound was inspected for any   remaining prominent bone and none was visible and the wound was then copiously    irrigated with sterile normal saline.  Next, attention was directed to the   medial aspect of the fifth digit DIPJ where a nucleated keratotic lesion was   noted.  A 1 cm incision was placed longitudinally and a second incision placed   elliptically to excise the nucleated lesion and the ellipse of tissue was passed   from the operative field in total.  Next, dissection was continued down to the   level of the DIPJ condyle, which was identified and noted to be prominent and   resected utilizing the bone rongeur.  The wound was then inspected for any   remaining prominent bone and none were visible.  The wound was copiously   irrigated with sterile normal saline.  Next, closure was performed with   reapproximation of the long extensor tendon of the dorsolateral aspect of the   fifth digit and utilizing 3-0 Vicryl.  Subcutaneous tissue was reapproximated   utilizing 4-0 Vicryl and skin on both incisions was reapproximated utilizing 3-0   and 4-0 nylon.  The wound was then further dressed with Betadine soaked   adaptics and a Betadine-soaked 4 x 4 for splinting, followed by further 4 x 4,   Humble, cast padding, and Ace.  Tourniquet was released with immediate hyperemia   to all digits of the left foot and the foot was then further dressed with Ace   bandaging.  The patient tolerated procedure and anesthesia well without   complications and was transported to the recovery area with all vital signs   stable and vascular status intact to the left foot.  Following a period of   postoperative monitoring, oral and written instructions were given to the   patient to keep dressings clean, dry and intact, to avoid excessive ambulation   and to ambulate only with heel weightbearing on the operative foot, using   crutches and walker to assist.  Prescription was written for cephalexin 500 and   hydrocodone 7.5/325.  She will follow up in 1 week and call if any problems   arise.      SONAM/  dd: 06/09/2017 20:40:17 (CDT)  td:  06/10/2017 01:59:41 (CDT)  Doc ID   #[id number]  Job ID #[id number]    CC:

## 2017-06-14 ENCOUNTER — OFFICE VISIT (OUTPATIENT)
Dept: PODIATRY | Facility: CLINIC | Age: 82
End: 2017-06-14
Payer: MEDICARE

## 2017-06-14 VITALS
DIASTOLIC BLOOD PRESSURE: 73 MMHG | HEIGHT: 60 IN | HEART RATE: 82 BPM | BODY MASS INDEX: 20.65 KG/M2 | WEIGHT: 105.19 LBS | SYSTOLIC BLOOD PRESSURE: 131 MMHG

## 2017-06-14 DIAGNOSIS — Z09 POSTOP CHECK: Primary | ICD-10-CM

## 2017-06-14 PROCEDURE — 99999 PR PBB SHADOW E&M-EST. PATIENT-LVL III: CPT | Mod: PBBFAC,,, | Performed by: PODIATRIST

## 2017-06-14 PROCEDURE — 99213 OFFICE O/P EST LOW 20 MIN: CPT | Mod: PBBFAC | Performed by: PODIATRIST

## 2017-06-14 PROCEDURE — 99024 POSTOP FOLLOW-UP VISIT: CPT | Mod: ,,, | Performed by: PODIATRIST

## 2017-06-14 RX ORDER — MAGNESIUM HYDROXIDE 400 MG/5ML
1 SUSPENSION, ORAL (FINAL DOSE FORM) ORAL
COMMUNITY

## 2017-06-14 NOTE — PROGRESS NOTES
Kimmy Hawk is following up 1 week post op fifth digit derotational arthroplasty with exostectomy left foot. Patient reports good compliance by keeping the dressings clean dry and intact and avoiding excessive ambulation and remaining nonweightbearing on the operative foot. Patient reports good pain control with prescribed pain meds.  Patient denies any fevers, chills, nausea, vomiting or pain. She she had some itching with the hydrocodone and transition to acetaminophen with no problems.  She also noted that she has not had any leg cramps following surgery which she normally had regularly.      LOWER EXTREMITY PHYSICAL EXAM:  VASCULAR: DP and PT pulses palpable with capillary fill time 3 seconds to left foot.   DERMATOLOGIC: Sutures intact and incision site well coapted. Minimal surrounding postoperative swelling and ecchymosis left foot. No surrounding redness or streaking.  NEURO: Plantar protective sensation by touch is present surrounding and distal to the operative incision site.  MUSCULOSKELETAL: Good deformity reduction with fifth digit derotational arthroplasty with exostectomy left foot. Positive passive and active range of motion of the digits.    ASSESSMENT AND PLAN: 1 week status post fifth digit derotational arthroplasty with exostectomy left foot. Dressings were changed and applied to the operative site left foot. Patient was instructed to keep the dressing clean dry and intact and remain nonweightbearing on operative foot with crutches in the post operative shoe.  Patient now recognizes that she is probably dehydrated ecchymosis time leading to more cramps.  She will try to pay attention to her hydration level. Patient will followup in 1 week for possible suture removal and will call if any problems arise.

## 2017-06-19 ENCOUNTER — TELEPHONE (OUTPATIENT)
Dept: PODIATRY | Facility: CLINIC | Age: 82
End: 2017-06-19

## 2017-06-19 NOTE — TELEPHONE ENCOUNTER
Returned patient's phone call who called stating she would like to unwrap post-op dressing and apply lubricant around post-op site due to itching. Educated on infection risk and verbalized understanding. Reminded of post-op appointment scheduled with Dr. Melissa DPM on 6/22/17 at 11am and educated that she can consult with Dr. Torres about itching at next appointment. Verbalized understanding

## 2017-06-19 NOTE — TELEPHONE ENCOUNTER
----- Message from Raquel Miller sent at 6/19/2017 11:12 AM CDT -----  Would like to speak to nurse. Please call back at [phone]. thanks

## 2017-06-22 ENCOUNTER — OFFICE VISIT (OUTPATIENT)
Dept: PODIATRY | Facility: CLINIC | Age: 82
End: 2017-06-22
Payer: MEDICARE

## 2017-06-22 VITALS
RESPIRATION RATE: 18 BRPM | SYSTOLIC BLOOD PRESSURE: 113 MMHG | HEIGHT: 60 IN | BODY MASS INDEX: 20.65 KG/M2 | DIASTOLIC BLOOD PRESSURE: 69 MMHG | HEART RATE: 81 BPM | WEIGHT: 105.19 LBS

## 2017-06-22 DIAGNOSIS — Z09 POSTOP CHECK: Primary | ICD-10-CM

## 2017-06-22 PROCEDURE — 99999 PR PBB SHADOW E&M-EST. PATIENT-LVL III: CPT | Mod: PBBFAC,,, | Performed by: PODIATRIST

## 2017-06-22 PROCEDURE — 99213 OFFICE O/P EST LOW 20 MIN: CPT | Mod: PBBFAC | Performed by: PODIATRIST

## 2017-06-22 PROCEDURE — 99024 POSTOP FOLLOW-UP VISIT: CPT | Mod: ,,, | Performed by: PODIATRIST

## 2017-06-22 NOTE — PROGRESS NOTES
Kimmy Hawk is following up 2 weeks post op fifth digit derotational arthroplasty with exostectomy left foot. Patient reports good compliance by keeping the dressings clean dry and intact and avoiding excessive ambulation. Patient denies any fevers, chills, nausea, vomiting or pain.     LOWER EXTREMITY PHYSICAL EXAM:  VASCULAR: DP and PT pulses palpable with capillary fill time 3 seconds to the left foot.   DERMATOLOGIC: Sutures intact and incision site well coapted. Minimal surrounding postoperative swelling and ecchymosis. No surrounding redness or streaking.  NEURO: Plantar protective sensation by touch is present surrounding and distal to the operative incision site.  MUSCULOSKELETAL: Good deformity reduction with fifth digit derotational arthroplasty with exostectomy left foot. Positive passive and active range of motion of the digits.     ASSESSMENT AND PLAN: 2 weeks status post fifth digit derotational arthroplasty with exostectomy left foot.  Sutures were removed and a light dressing applied to the operative foot. Patient was instructed to keep the foot dry for additional 24 hours, but after that may begin to bathe and get the foot wet. Patient was also instructed to continue Ace bandage wrapping to control swelling as needed and ambulate in postoperative shoe or begin transitioning into supportive wide toe box shoe as tolerated. Patient will followup in 2 weeks for postoperative x-rays or call if any problems arise.

## 2017-07-07 ENCOUNTER — HOSPITAL ENCOUNTER (OUTPATIENT)
Dept: RADIOLOGY | Facility: HOSPITAL | Age: 82
Discharge: HOME | End: 2017-07-07
Attending: PODIATRIST
Payer: MEDICARE

## 2017-07-07 ENCOUNTER — OFFICE VISIT (OUTPATIENT)
Dept: PODIATRY | Facility: CLINIC | Age: 82
End: 2017-07-07
Payer: MEDICARE

## 2017-07-07 VITALS
SYSTOLIC BLOOD PRESSURE: 134 MMHG | WEIGHT: 106.06 LBS | BODY MASS INDEX: 20.71 KG/M2 | DIASTOLIC BLOOD PRESSURE: 72 MMHG

## 2017-07-07 DIAGNOSIS — Z09 POSTOP CHECK: Primary | ICD-10-CM

## 2017-07-07 DIAGNOSIS — Z09 POSTOP CHECK: ICD-10-CM

## 2017-07-07 PROCEDURE — 99024 POSTOP FOLLOW-UP VISIT: CPT | Mod: ,,, | Performed by: PODIATRIST

## 2017-07-07 PROCEDURE — 99212 OFFICE O/P EST SF 10 MIN: CPT | Mod: PBBFAC,25,PO | Performed by: PODIATRIST

## 2017-07-07 PROCEDURE — 99999 PR PBB SHADOW E&M-EST. PATIENT-LVL II: CPT | Mod: PBBFAC,,, | Performed by: PODIATRIST

## 2017-07-07 PROCEDURE — 73630 X-RAY EXAM OF FOOT: CPT | Mod: 26,LT,, | Performed by: RADIOLOGY

## 2017-07-10 NOTE — PROGRESS NOTES
Kimmy Hawk is following up 4 weeks post op fifth digit derotational arthroplasty with exostectomy left foot. Patient reports good compliance by limiting weightbearing on the operative foot in postop shoe. Patient denies any fevers, chills, nausea, vomiting or pain.     LOWER EXTREMITY PHYSICAL EXAM:  VASCULAR: DP and PT pulses palpable with capillary fill time 3 seconds to the left foot.   DERMATOLOGIC: Incision site well coapted. Minimal surrounding postoperative swelling and no ecchymosis. No surrounding redness or streaking. No hypertrophy of surgical scar. Keratotic eschar with complete epithelialization post debridement.  NEURO: Plantar protective sensation by touch is present surrounding and distal to the operative incision site.  MUSCULOSKELETAL: Good deformity reduction with fifth digit derotational arthroplasty with exostectomy right foot. Positive passive and active range of motion of the digits. No pain to palpation or range of motion.    X-rays: Reduction maintained    ASSESSMENT AND PLAN: 4 weeks status post fifth digit derotational arthroplasty with exostectomy left foot. Patient was instructed to continue Ace bandage wrapping to control swelling as needed and gradually transition and increase ambulation in the recommended supportive shoes and insoles until further instructed.  Patient will followup in  4 weeks for final po check or call if any problems arise.

## 2017-09-01 ENCOUNTER — OFFICE VISIT (OUTPATIENT)
Dept: PODIATRY | Facility: CLINIC | Age: 82
End: 2017-09-01
Payer: MEDICARE

## 2017-09-01 VITALS — BODY MASS INDEX: 21.08 KG/M2 | HEIGHT: 60 IN | WEIGHT: 107.38 LBS

## 2017-09-01 DIAGNOSIS — Z09 POSTOP CHECK: Primary | ICD-10-CM

## 2017-09-01 PROCEDURE — 99212 OFFICE O/P EST SF 10 MIN: CPT | Mod: PBBFAC,PO | Performed by: PODIATRIST

## 2017-09-01 PROCEDURE — 99999 PR PBB SHADOW E&M-EST. PATIENT-LVL II: CPT | Mod: PBBFAC,,, | Performed by: PODIATRIST

## 2017-09-01 PROCEDURE — 99024 POSTOP FOLLOW-UP VISIT: CPT | Mod: ,,, | Performed by: PODIATRIST

## 2017-09-01 RX ORDER — FLUTICASONE PROPIONATE 50 MCG
SPRAY, SUSPENSION (ML) NASAL
COMMUNITY
Start: 2017-07-24

## 2017-09-01 RX ORDER — AZELASTINE 1 MG/ML
SPRAY, METERED NASAL
COMMUNITY
Start: 2017-07-24

## 2017-09-01 NOTE — PROGRESS NOTES
Kimmy Hawk is following up 3 months post op fifth digit derotational arthroplasty with exostectomy left foot. Patient reports transition to normal shoes without any problems.  The only thing that she has notices a small lump over the surgical scar incision site on the left fifth toe.  She states it does not bother her but was wondering what it might be.    LOWER EXTREMITY PHYSICAL EXAM:  VASCULAR: DP and PT pulses palpable with capillary fill time 3 seconds to the left foot.   DERMATOLOGIC: Incision site well coapted.  Resolved surrounding postoperative swelling and no ecchymosis. No surrounding redness or streaking. No hypertrophy of surgical scar.  Small superficial nodule likely absorbable suture expressing to the surface dorsal fifth digit.  NEURO: Plantar protective sensation by touch is present surrounding and distal to the operative incision site.  MUSCULOSKELETAL: Good deformity reduction with fifth digit derotational arthroplasty with exostectomy right foot. Positive passive and active range of motion of the digits. No pain to palpation or range of motion.      ASSESSMENT AND PLAN: 4 weeks status post fifth digit derotational arthroplasty with exostectomy left foot.  Explained to the patient that the small nodule is likely a observable suture surfacing.  She may apply urea lotion over the area for softening and exfoliation for a eventual expression of suture.  She may resume her normal activities and call if any problems arise.

## 2017-09-01 NOTE — PATIENT INSTRUCTIONS
Gradual transition to normal activities in recommended supportive shoes.    Apply cream or lotion to skin lesions as directed.  Eucerin or Amlactin as alternatives.

## 2018-10-17 NOTE — PLAN OF CARE
Pt resting on stretcher s/p lt hammertoe repair performed under MAC anesthesia by Dr. Torres. Pt denies pain at present. Respirations even and unlabored on room air and tolerating well. Neurovascular checks remain intact. See flow sheet for detailed assessment.    No

## 2019-07-17 ENCOUNTER — TELEPHONE (OUTPATIENT)
Dept: OBSTETRICS AND GYNECOLOGY | Facility: CLINIC | Age: 84
End: 2019-07-17

## 2019-07-17 NOTE — TELEPHONE ENCOUNTER
----- Message from Marilyn Harris sent at 7/17/2019 11:16 AM CDT -----  Contact: Pt  Type:  Same Day Appointment Request    Caller is requesting a same day appointment.  Caller declined first available appointment listed below.    Name of Caller: Pt   When is the first available appointment? unknown  Symptoms: est care/well woman exam  Best Call Back Number:[phone] (home)   Additional Information: The patient is requesting to be scheduled with Alissa Mason if possible,please advise

## 2019-07-19 ENCOUNTER — TELEPHONE (OUTPATIENT)
Dept: OBSTETRICS AND GYNECOLOGY | Facility: CLINIC | Age: 84
End: 2019-07-19

## 2021-07-22 ENCOUNTER — TELEPHONE (OUTPATIENT)
Dept: INTERNAL MEDICINE | Facility: CLINIC | Age: 86
End: 2021-07-22

## 2023-11-16 NOTE — TELEPHONE ENCOUNTER
Spoke with patient, she says that she was calling for Dr. Mason, Urologist.  Declined being transferred and said that she alright found her answer.   FAMILY HISTORY:  Mother  Still living? No  Family history of diabetes mellitus (DM), Age at diagnosis: Age Unknown  FH: pancreatic cancer, Age at diagnosis: Age Unknown

## (undated) DEVICE — COVER OVERHEAD SURG LT BLUE

## (undated) DEVICE — GAUZE SPONGE 4X4 12PLY

## (undated) DEVICE — PAD CAST SPECIALIST STRL 4

## (undated) DEVICE — SYR 10CC LUER LOCK

## (undated) DEVICE — DRAPE PLASTIC U 60X72

## (undated) DEVICE — DRAPE EXTREMITY W/ABC NON-SLIP

## (undated) DEVICE — SUT 4-0 ETHILON 18 PS-2

## (undated) DEVICE — BLADE SAW MED NAR 18X5.5MM

## (undated) DEVICE — GLOVE BIOGEL ECLIPSE SZ 6.5

## (undated) DEVICE — ELECTRODE REM PLYHSV RETURN 9

## (undated) DEVICE — NDL SAFETY 25G X 1.5 ECLIPSE

## (undated) DEVICE — SEE MEDLINE ITEM 157027

## (undated) DEVICE — MANIFOLD 4 PORT

## (undated) DEVICE — BANDAGE ELASTIC 3X5 VELCRO ST

## (undated) DEVICE — LINER GLOVE POWDERFREE SZ 6.5

## (undated) DEVICE — SUT VICRYL 4-0 ANTIBACT

## (undated) DEVICE — SEE MEDLINE ITEM 157117

## (undated) DEVICE — SEE MEDLINE ITEM 152622

## (undated) DEVICE — SUT VICRYL PLUS 3-0 PS2 18

## (undated) DEVICE — SEE MEDLINE ITEM 152522

## (undated) DEVICE — DRAPE SURG W/TWL 17 5/8X23

## (undated) DEVICE — SUT ETHILON 3-0 PS2 18 BLK

## (undated) DEVICE — SEE MEDLINE ITEM 152529

## (undated) DEVICE — SEE MEDLINE ITEM 146231

## (undated) DEVICE — APPLICATOR CHLORAPREP ORN 26ML